# Patient Record
Sex: MALE | Race: WHITE | NOT HISPANIC OR LATINO | Employment: OTHER | URBAN - METROPOLITAN AREA
[De-identification: names, ages, dates, MRNs, and addresses within clinical notes are randomized per-mention and may not be internally consistent; named-entity substitution may affect disease eponyms.]

---

## 2022-09-20 ENCOUNTER — OFFICE VISIT (OUTPATIENT)
Dept: VASCULAR SURGERY | Facility: CLINIC | Age: 72
End: 2022-09-20
Payer: COMMERCIAL

## 2022-09-20 DIAGNOSIS — I87.303 STASIS EDEMA, BILATERAL: ICD-10-CM

## 2022-09-20 DIAGNOSIS — R60.0 BILATERAL LEG EDEMA: Primary | ICD-10-CM

## 2022-09-20 DIAGNOSIS — I83.893 VARICOSE VEINS OF LEG WITH SWELLING, BILATERAL: ICD-10-CM

## 2022-09-20 PROCEDURE — 99204 OFFICE O/P NEW MOD 45 MIN: CPT | Performed by: SPECIALIST

## 2022-09-20 RX ORDER — ISOSORBIDE MONONITRATE 60 MG/1
TABLET, EXTENDED RELEASE ORAL
COMMUNITY

## 2022-09-20 RX ORDER — TAMSULOSIN HYDROCHLORIDE 0.4 MG/1
CAPSULE ORAL
COMMUNITY
Start: 2022-07-17

## 2022-09-20 RX ORDER — LISINOPRIL 40 MG/1
TABLET ORAL
COMMUNITY
Start: 2022-07-03

## 2022-09-20 RX ORDER — CEFUROXIME AXETIL 500 MG/1
TABLET ORAL
COMMUNITY

## 2022-09-20 RX ORDER — FUROSEMIDE 20 MG
TABLET ORAL
COMMUNITY

## 2022-09-20 RX ORDER — METOPROLOL TARTRATE 25 MG/1
TABLET, FILM COATED ORAL
COMMUNITY

## 2022-09-20 RX ORDER — MULTIPLE VITAMINS W/ MINERALS TAB 9MG-400MCG
1 TAB ORAL DAILY
COMMUNITY

## 2022-09-20 RX ORDER — AMLODIPINE BESYLATE 10 MG/1
TABLET ORAL
COMMUNITY

## 2022-09-20 RX ORDER — HYDRALAZINE HYDROCHLORIDE 25 MG/1
TABLET, FILM COATED ORAL
COMMUNITY

## 2022-09-20 RX ORDER — BUSPIRONE HYDROCHLORIDE 7.5 MG/1
TABLET ORAL
COMMUNITY

## 2022-09-20 RX ORDER — SIMVASTATIN 20 MG
TABLET ORAL
COMMUNITY
Start: 2022-08-02

## 2022-09-20 RX ORDER — CITALOPRAM HYDROBROMIDE 20 MG/1
TABLET ORAL
COMMUNITY

## 2022-09-20 NOTE — PROGRESS NOTES
Consult requested by Dr. Chahal     Reason for consultation: Bilateral lower extremity edema.    HPI:  Patient is a 72-year-old white male with a longstanding history of bilateral lower extremity edema.  It is progressively worsened over time especially after his recent knee replacement and pelvic radiation for prostate cancer.  His right leg is worse than his left.  He denies any pain, varicose veins, nonhealing wounds or superficial phlebitis..  He has worn compression but now has difficulty getting it on.  He also has a history of a provoked left lower extremity DVT in 2018 after a long drive.  He has not had an ultrasound for reflux.  He now presents to me for evaluation of his bilateral lower extremity edema.    Pmhx: Hypertension, hyperlipidemia, history of prostate cancer status post radiation therapy, history of DVT in 2018    PsHx: Bilateral knee replacements,  tonsils, appendectomy      Current Outpatient Medications   Medication     apixaban ANTICOAGULANT (ELIQUIS) 2.5 MG tablet     amLODIPine (NORVASC) 10 MG tablet     busPIRone (BUSPAR) 7.5 MG tablet     cefuroxime (CEFTIN) 500 MG tablet     citalopram (CELEXA) 20 MG tablet     furosemide (LASIX) 20 MG tablet     hydrALAZINE (APRESOLINE) 25 MG tablet     isosorbide mononitrate (IMDUR) 60 MG 24 hr tablet     lisinopril (ZESTRIL) 40 MG tablet     metoprolol tartrate (LOPRESSOR) 25 MG tablet     multivitamin w/minerals (MULTIVITAMIN, THERAPEUTIC WITH MINERALS) tablet     simvastatin (ZOCOR) 20 MG tablet     tamsulosin (FLOMAX) 0.4 MG capsule     No current facility-administered medications for this visit.        Allergies   Allergen Reactions     Tetracycline Hives     Social History     Social History Narrative     Not on file     No family history on file.     ROS: 10 point ROS neg other than the symptoms noted above in the HPI.    PE:  B/P: Data Unavailable, T: Data Unavailable, P: Data Unavailable, R: Data Unavailable  General: well developed,  well nourished obese WM who appears their stated age  HEENT: NC/AT, EOMI, (-)icterus, (-)injection  Neck: Supple, No JVD  Chest: CTA  Heart: S1, S2, (-)m/r/g  Abd: Soft, non tender, non distended, non tender, no masses  Ext; Warm, right lower extremity with +2 edema and gaiter stasis changes.  No obvious varicose veins.  Left lower extremity:+1 edema with gaiter stasis changes and no obvious varicosities  Psych: AAOx3  Neuro: No focal deficits    Impression/plan:  This is a 72-year-old white male with bilateral lower extremity of unclear etiology with the right being worse than the left..  He has no obvious varicose vein so I suspect this may represent longstanding lymphedema.  He does have bilateral stasis changes making a CEAP 4 bilaterally.  I discussed the various etiologies of lower extremity edema and he expressed understanding.  After discussion the patient the plan at this time is to send him for an ultrasound for reflux and proceed based on those findings.    Genaro Riojas MD, FACS

## 2022-09-20 NOTE — LETTER
9/20/2022         RE: Jack Kingsley  [id number] [address] [facility] [address]        Dear Colleague,    Thank you for referring your patient, Jack Kingsley, to the [facility] [place]. Please see a copy of my visit note below.    Consult requested by Dr. Chahal     Reason for consultation: Bilateral lower extremity edema.    HPI:  Patient is a 72-year-old white male with a longstanding history of bilateral lower extremity edema.  It is progressively worsened over time especially after his recent knee replacement and pelvic radiation for prostate cancer.  His right leg is worse than his left.  He denies any pain, varicose veins, nonhealing wounds or superficial phlebitis..  He has worn compression but now has difficulty getting it on.  He also has a history of a provoked left lower extremity DVT in 2018 after a long drive.  He has not had an ultrasound for reflux.  He now presents to me for evaluation of his bilateral lower extremity edema.    Pmhx: Hypertension, hyperlipidemia, history of prostate cancer status post radiation therapy, history of DVT in 2018    PsHx: Bilateral knee replacements,  tonsils, appendectomy      Current Outpatient Medications   Medication     apixaban ANTICOAGULANT (ELIQUIS) 2.5 MG tablet     amLODIPine (NORVASC) 10 MG tablet     busPIRone (BUSPAR) 7.5 MG tablet     cefuroxime (CEFTIN) 500 MG tablet     citalopram (CELEXA) 20 MG tablet     furosemide (LASIX) 20 MG tablet     hydrALAZINE (APRESOLINE) 25 MG tablet     isosorbide mononitrate (IMDUR) 60 MG 24 hr tablet     lisinopril (ZESTRIL) 40 MG tablet     metoprolol tartrate (LOPRESSOR) 25 MG tablet     multivitamin w/minerals (MULTIVITAMIN, THERAPEUTIC WITH MINERALS) tablet     simvastatin (ZOCOR) 20 MG tablet     tamsulosin (FLOMAX) 0.4 MG capsule     No current facility-administered medications for this visit.        Allergies   Allergen Reactions     Tetracycline Hives     Social History     Social  History Narrative     Not on file     No family history on file.     ROS: 10 point ROS neg other than the symptoms noted above in the HPI.    PE:  B/P: Data Unavailable, T: Data Unavailable, P: Data Unavailable, R: Data Unavailable  General: well developed, well nourished obese WM who appears their stated age  HEENT: NC/AT, EOMI, (-)icterus, (-)injection  Neck: Supple, No JVD  Chest: CTA  Heart: S1, S2, (-)m/r/g  Abd: Soft, non tender, non distended, non tender, no masses  Ext; Warm, right lower extremity with +2 edema and gaiter stasis changes.  No obvious varicose veins.  Left lower extremity:+1 edema with gaiter stasis changes and no obvious varicosities  Psych: AAOx3  Neuro: No focal deficits    Impression/plan:  This is a 72-year-old white male with bilateral lower extremity of unclear etiology with the right being worse than the left..  He has no obvious varicose vein so I suspect this may represent longstanding lymphedema.  He does have bilateral stasis changes making a CEAP 4 bilaterally.  I discussed the various etiologies of lower extremity edema and he expressed understanding.  After discussion the patient the plan at this time is to send him for an ultrasound for reflux and proceed based on those findings.    Genaro Riojas MD, FACS      Again, thank you for allowing me to participate in the care of your patient.        Sincerely,        Genaro Riojas MD

## 2022-09-20 NOTE — NURSING NOTE
Patient Reported symptoms:    Right leg   Heaviness None of the time   Achiness None of the time   Swelling All of the time  Throbbing None of the time   Itching None of the time   Appearance Extremely noticeable   Impact on work/activities Symptoms but full able to participate    Left Leg   Heaviness None of the time   Achiness None of the time   Swelling All of the time  Throbbing None of the time   Itching None of the time   Appearance Extremely noticeable   Impact on work/activities Symptoms but full able to participate

## 2022-09-20 NOTE — PATIENT INSTRUCTIONS
Varicose Veins and Spider Veins    Varicose veins are swollen, enlarged veins most often found in the legs. They are usually blue or purple in color and may bulge, twist, and stand out under the skin. Spider veins are small veins just under your skin that can look red, blue or purple.        Normally, veins return blood from the body to the heart. The leg veins have one-way valves that prevent blood from flowing backward in the vein. When the valves are weak or damaged, blood backs up in the veins. This may cause some of the veins to swell and bulge and become varicose veins.     Symptoms  Varicose veins may or may not cause symptoms. If symptoms do occur, they can include:  Legs that feel tired, achy, heavy, or itchy  Leg swelling  Leg muscle cramps  Skin changes, such as discoloration, dryness, redness, or rash (in more severe cases, you may also have sores on the skin called venous leg ulcers)    Risk factors  There are a number of factors that increase the risk for varicose veins. These can include:   Being a woman  Being older  Sitting or standing for long periods  Being overweight  Being pregnant  Having a family history of varicose veins  Hormones, birth control pills    Treatment starts with simple self-help measures (see below). If these don't help, there are many procedures that can be done to shrink or remove varicose veins. Your healthcare provider can tell you more about these options, if needed.     Home care  Support or compression stockings will likely be prescribed. If so, be sure to wear them as directed. They may help improve blood flow.  Exercising helps strengthen your leg muscles and improve blood flow. To get the most benefit, choose exercises such as walking, swimming, or cycling. Also try to exercise for at least 30 minutes on most days.  Raising your legs above heart level will help relieve swelling and keep blood from pooling in veins. Try to elevate your legs for 15 to 20 minutes at  the end of the day, and whenever you're relaxing. To make sure your legs are raised above heart level, prop them up on cushions or large pillows.  To keep blood moving when you have to sit or stand for long periods, try these tips:  At work, take walking breaks instead of coffee breaks. Walk during your lunch hour. Or try flexing your feet up and down 10 times each hour.  When standing, raise yourself up and down on your toes, or rock back and forth on your heels.  If you are overweight, talk with your healthcare provider about setting up a weight-loss plan. Maintaining a healthy weight can help reduce the strain on your veins. It may also improve symptoms, such as swelling and aching.  If you have dryness and itching, ask your provider about special lotions that can be applied to the skin to help improve symptoms.     Follow-up care  Follow up with your healthcare provider, or as directed. If imaging tests were done, you'll be told the results and if there are any new findings that affect your care.     When to seek medical advice  Call your healthcare provider right away if any of these occur:  Sudden, severe leg swelling, pain, or redness  Symptoms worsen, or they don't improve with self-care  Bleeding from any affected veins  Ulcers form on the legs, ankles, or feet  Fever of 100.4 F (38 C) or higher, or as advised by your provider    [employer] last reviewed this educational content on 4/1/2018 2000-2021 [employer]. All rights reserved. This information is not intended as a substitute for professional medical care. Always follow your healthcare professional's instructions.    Self-Care for Spider and Varicose Veins    Your healthcare provider may suggest that you try self-care. Exercising and maintaining a healthy weight may keep problem veins from getting worse. Wearing elastic stockings and elevating your legs can help improve blood flow. Taking breaks when you sit or stand helps,  too.        Wearing compression stockings  Compression stockings gently squeeze veins so blood flows upward. If you need compression stockings, your healthcare provider can prescribe them for you. Follow your healthcare provider's advice about how and when to wear them. Compression stockings come in several different levels of pressure. Ask your healthcare provider which level of pressure would help you the most.     Raising your legs above heart level will help relieve swelling and keep blood from pooling in veins. Try to elevate your legs for 15 to 20 minutes at the end of the day, and whenever you're relaxing. To make sure your legs are raised above heart level, prop them up on cushions or large pillows.    To keep blood moving when you have to sit or stand for long periods, try these tips:  At work, take walking breaks instead of coffee breaks. Walk during your lunch hour. Or try flexing your feet up and down 10 times each hour.  When standing, raise yourself up and down on your toes, or rock back and forth on your heels.    [place] last reviewed this educational content on 11/1/2019 2000-2021 [employer]. All rights reserved. This information is not intended as a substitute for professional medical care. Always follow your healthcare professional's instructions.    Treatment Options    Sclerotherapy  Your healthcare provider will inject the vein with a special chemical that will quickly close the vein from the inside. This is particularly useful for spider veins and smaller varicose veins.      If you have large varicose veins, surgery may be the best choice. But it will not prevent new varicose veins from forming. Surgery is most often done in a surgery center or in the office.    If surgery is recommended for you, your surgery will be tailored to your needs. Varicose veins may be tied off (ligation), destroyed, or removed. Blood will then flow through the healthy veins. One or more of the  following techniques may be used:    Ablation (laser or radiofrequency)  A tiny cut in the skin is made near the varicose vein. A small tube called a catheter is inserted into the vein. Energy or heat released from the catheter tip will make the vein walls collapse and stick together, stopping all blood flow through the vein.         Ablation (glue)  A tiny cut in the skin is made near the varicose vein. A small tube called a catheter is inserted into the vein. Droplets of glue are deposited into the vein to make vein walls collapse and stick together stopping blood flow through the vein.    Microphlebectomy or ambulatory phlebectomy  A special hook is used to gently take out a varicose vein through tiny incisions. Microphlebectomy may be done in your healthcare provider's office.      Vein stripping and ligation (rare)  In more severe cases, the surgeon may tie off and remove veins by making smaller cuts in the skin. Smaller branching veins may also be tied off or removed.    Know about the risks  Your healthcare provider will talk with you about the risks of surgery. These include:  Bleeding or swelling  A sense of numbness, burning, or tingling in areas near the procedure  Edema or swelling in the legs  Clots in the deep veins that may travel to the lungs  Infection  Scarring  Inflammation related to the glue    [employer] last reviewed this educational content on 11/1/2019 (Sclerotherapy image) 12/1/2019 (Radiofrequency ablation image, Microphlebectomy image)    6547-7170 [employer]. All rights reserved. This information is not intended as a substitute for professional medical care. Always follow your healthcare professional's instructions.

## 2022-09-21 ENCOUNTER — ANCILLARY PROCEDURE (OUTPATIENT)
Dept: ULTRASOUND IMAGING | Facility: CLINIC | Age: 72
End: 2022-09-21
Attending: SPECIALIST
Payer: COMMERCIAL

## 2022-09-21 ENCOUNTER — VIRTUAL VISIT (OUTPATIENT)
Dept: VASCULAR SURGERY | Facility: CLINIC | Age: 72
End: 2022-09-21
Payer: COMMERCIAL

## 2022-09-21 DIAGNOSIS — I87.303 STASIS EDEMA, BILATERAL: ICD-10-CM

## 2022-09-21 DIAGNOSIS — I89.0 LYMPHEDEMA OF BOTH LOWER EXTREMITIES: Primary | ICD-10-CM

## 2022-09-21 DIAGNOSIS — I83.893 VARICOSE VEINS OF LEG WITH SWELLING, BILATERAL: ICD-10-CM

## 2022-09-21 PROCEDURE — 93970 EXTREMITY STUDY: CPT | Performed by: SPECIALIST

## 2022-09-21 PROCEDURE — 99213 OFFICE O/P EST LOW 20 MIN: CPT | Mod: 95 | Performed by: SPECIALIST

## 2022-09-21 NOTE — PROGRESS NOTES
Jack is a 72 year old who is being evaluated via a billable video visit.      How would you like to obtain your AVS? MyChart  If the video visit is dropped, the invitation should be resent by: Text to cell phone: [phone]  Will anyone else be joining your video visit? No          Video-Visit Details    Video Start Time: 3:44 PM    Type of service:  Video Visit    Consult requested by Dr. Chahal     Reason for consultation: Bilateral lower extremity edema.    Subjective:  Patient is a 72-year-old white male with a longstanding history of bilateral lower extremity edema.  It is progressively worsened over time especially after his recent knee replacement and pelvic radiation for prostate cancer.  His right leg is worse than his left.  He denies any pain, varicose veins, nonhealing wounds or superficial phlebitis..  He has worn compression but now has difficulty getting it on.  He also has a history of a provoked left lower extremity DVT in 2018 after a long drive.      He has ultrasound for which she now follows up.      Objective:  Ext; Warm, right lower extremity with +2 edema and gaiter stasis changes.  No obvious varicose veins.  Left lower extremity:+1 edema with gaiter stasis changes and no obvious varicosities    Name:  Jack Kingsley                                                     Patient ID: [id number]  Date: 2022                                                     : 1950  Sex: male                                                                    Examined by: HAVEN Vazquez RVT  Age:  72 year old                                                         Reading MD: Genaro Riojas MD     INDICATION:  Bilateral vv with edema R>L ; hx of Lt DVT in 2018      EXAM TYPE  BILATERAL LOWER EXTREMITY VENOUS DUPLEX FOR VENOUS INSUFFICIENCY  TECHNICAL SUMMARY     A duplex ultrasound study using color flow was performed, to evaluate the bilateral lower extremity veins for valvular  incompetence with the patient in a steep reversed trendelenberg.      RIGHT:     The deep veins demonstrate phasic flow, compress and respond to augmentations.  There is no DVT.  The common femoral and proximal femoral veins are  incompetent and free of thrombus. The remaining deep veins are competent and free of thrombus.      The GSV demonstrates phasic flow, compresses and responds to augmentations from the saphenofemoral junction to the ankle with no evidence of thrombus. The great saphenous vein measures 8.3 mm at the saphenofemoral junction, 7.3 mm at the proximal thigh and 5.1 mm at the knee. The GSV is incompetent from Proximal Calf to Distal Calf, with the greatest reflux time of 4965 milliseconds.       The AASV is not visualized.      The Giacomini vein is competent( 1.1 mm) communicating with the small saphenous vein at the knee level.      The SSV demonstrates phasic flow, compresses and responds to augmentations from the popliteal space to the ankle.  No thrombus is seen. The saphenopopliteal junction is competent (2.4 mm). The SSV is incompetent at the distal calf with a reflux time of 1221 milliseconds.  The SSV gives rise to a varicose branch measuring 2.4 mm off the Mid Calf that courses Medial with a reflux time of 2244 milliseconds.       Perforators:  There is an incompetent  vein ( 2.4 mm) at medial mallelous that communicates with the GSV. There is a second incompetent  vein (2.9 mm) at the mid calf 23 cm above the medial malleolus that communicates with the GSV.         LEFT:     The deep veins demonstrate phasic flow, compress and respond to augmentations.  There is no  DVT.  The common femoral vein is incompetent and free of thrombus. The remaining deep veins are competent and free of thrombus.      The GSV demonstrates phasic flow, compresses and responds to augmentations from the saphenofemoral junction to the ankle with no evidence of thrombus. The great saphenous  vein measures 8.4 mm at the saphenofemoral junction, 6.7 mm at the proximal thigh and 5.0 mm at the knee. The GSV is incompetent at the SFJ and from the mid to distal calf, with the greatest reflux time of 1254 milliseconds.       The AASV is competent ( 3.7 mm) draining into the saphenofemoral junction.      The Giacomini vein is incompetent ( 2.2 mm) communicating with the small saphenous vein at the knee level. The Giacomini vein is incompetent at the distal thigh with a reflux time of 1254 milliseconds.      The SSV demonstrates phasic flow, compresses and responds to augmentations from the popliteal space to the ankle.  No thrombus is seen. The saphenopopliteal junction is competent ( 5.6 mm).  The SSV is incompetent from the Mid Calf to Distal Calf with a reflux time of 2409 milliseconds.   The SSV gives rise to multiple incompetent varicose veins, the largest measuring 2.2 mm off the Mid Calf that courses Medial with a reflux time of 1188 milliseconds.     Perforators: there is no evidence of incompetent  veins at any level.      FINAL SUMMARY:     1.  There is no evidence of DVT.         2.  There is deep system incompetence in the right and left common femoral veins     3.  The right GSV is incompetent in the proximal to distal calf.     4.  There are 2 incompetent perforators in the distal calf.  One communicates with the GSV and the other is 23 cm above the medial malleolus.     5.  The right SSV has short segment incompetence in the mid calf.     6.  The left GSV is incompetent at the SF J and mid to distal calf.     7.  The left SSV is incompetent mid to distal calf giving rise to multiple incompetent branches.     8.  The time of incompetence is greater than 500 milliseconds in the superficial and  veins and greater than 1000 milliseconds in the deep veins.         Kate Riojas MD, FACS      Assessment/plan:  This is a 72-year-old white male with bilateral lower extremity of  unclear etiology with the right being worse than the left..  He has no obvious varicose veinS.  He does have bilateral stasis changes making a CEAP 4 bilaterally.  His ultrasound revealed only minimal superficial system reflux.  He does have some proximal deep system reflux in both his lower extremities as well.  I do not feel treating his superficial system reflux would fix his edema.  I suspect most of his symptoms are due to lymphedema.  I discussed these findings with the patient expressed understanding.  After discussion patient the plan at this time is refer him to lymphedema therapy.  He will follow-up with us as necessary.      Genaro Riojas MD, FACS          Video End Time:4:04 PM    Originating Location (pt. Location): Home    Distant Location (provider location):  [facility] [place]     Platform used for Video Visit: Cinemagram

## 2022-09-21 NOTE — LETTER
2022         RE: Jack Kingsley  [address] [address] [facility] [address]        Dear Colleague,    Thank you for referring your patient, Jack Kingsley, to the [facility] [place]. Please see a copy of my visit note below.    Jack is a 72 year old who is being evaluated via a billable video visit.      How would you like to obtain your AVS? MyChart  If the video visit is dropped, the invitation should be resent by: Text to cell phone: [phone]  Will anyone else be joining your video visit? No          Video-Visit Details    Video Start Time: 3:44 PM    Type of service:  Video Visit    Consult requested by Dr. Chahal     Reason for consultation: Bilateral lower extremity edema.    Subjective:  Patient is a 72-year-old white male with a longstanding history of bilateral lower extremity edema.  It is progressively worsened over time especially after his recent knee replacement and pelvic radiation for prostate cancer.  His right leg is worse than his left.  He denies any pain, varicose veins, nonhealing wounds or superficial phlebitis..  He has worn compression but now has difficulty getting it on.  He also has a history of a provoked left lower extremity DVT in 2018 after a long drive.      He has ultrasound for which she now follows up.      Objective:  Ext; Warm, right lower extremity with +2 edema and gaiter stasis changes.  No obvious varicose veins.  Left lower extremity:+1 edema with gaiter stasis changes and no obvious varicosities    Name:  Jack Kingsley                                                     Patient ID: [id number]  Date: 2022                                                     : 1950  Sex: male                                                                    Examined by: HAVEN Vazquez RVT  Age:  72 year old                                                         Reading MD: Genaro Riojas MD     INDICATION:  Bilateral vv with  edema R>L ; hx of Lt DVT in 2018      EXAM TYPE  BILATERAL LOWER EXTREMITY VENOUS DUPLEX FOR VENOUS INSUFFICIENCY  TECHNICAL SUMMARY     A duplex ultrasound study using color flow was performed, to evaluate the bilateral lower extremity veins for valvular incompetence with the patient in a steep reversed trendelenberg.      RIGHT:     The deep veins demonstrate phasic flow, compress and respond to augmentations.  There is no DVT.  The common femoral and proximal femoral veins are  incompetent and free of thrombus. The remaining deep veins are competent and free of thrombus.      The GSV demonstrates phasic flow, compresses and responds to augmentations from the saphenofemoral junction to the ankle with no evidence of thrombus. The great saphenous vein measures 8.3 mm at the saphenofemoral junction, 7.3 mm at the proximal thigh and 5.1 mm at the knee. The GSV is incompetent from Proximal Calf to Distal Calf, with the greatest reflux time of 4965 milliseconds.       The AASV is not visualized.      The Giacomini vein is competent( 1.1 mm) communicating with the small saphenous vein at the knee level.      The SSV demonstrates phasic flow, compresses and responds to augmentations from the popliteal space to the ankle.  No thrombus is seen. The saphenopopliteal junction is competent (2.4 mm). The SSV is incompetent at the distal calf with a reflux time of 1221 milliseconds.  The SSV gives rise to a varicose branch measuring 2.4 mm off the Mid Calf that courses Medial with a reflux time of 2244 milliseconds.       Perforators:  There is an incompetent  vein ( 2.4 mm) at medial mallelous that communicates with the GSV. There is a second incompetent  vein (2.9 mm) at the mid calf 23 cm above the medial malleolus that communicates with the GSV.         LEFT:     The deep veins demonstrate phasic flow, compress and respond to augmentations.  There is no  DVT.  The common femoral vein is incompetent and  free of thrombus. The remaining deep veins are competent and free of thrombus.      The GSV demonstrates phasic flow, compresses and responds to augmentations from the saphenofemoral junction to the ankle with no evidence of thrombus. The great saphenous vein measures 8.4 mm at the saphenofemoral junction, 6.7 mm at the proximal thigh and 5.0 mm at the knee. The GSV is incompetent at the SFJ and from the mid to distal calf, with the greatest reflux time of 1254 milliseconds.       The AASV is competent ( 3.7 mm) draining into the saphenofemoral junction.      The Giacomini vein is incompetent ( 2.2 mm) communicating with the small saphenous vein at the knee level. The Giacomini vein is incompetent at the distal thigh with a reflux time of 1254 milliseconds.      The SSV demonstrates phasic flow, compresses and responds to augmentations from the popliteal space to the ankle.  No thrombus is seen. The saphenopopliteal junction is competent ( 5.6 mm).  The SSV is incompetent from the Mid Calf to Distal Calf with a reflux time of 2409 milliseconds.   The SSV gives rise to multiple incompetent varicose veins, the largest measuring 2.2 mm off the Mid Calf that courses Medial with a reflux time of 1188 milliseconds.     Perforators: there is no evidence of incompetent  veins at any level.      FINAL SUMMARY:     1.  There is no evidence of DVT.         2.  There is deep system incompetence in the right and left common femoral veins     3.  The right GSV is incompetent in the proximal to distal calf.     4.  There are 2 incompetent perforators in the distal calf.  One communicates with the GSV and the other is 23 cm above the medial malleolus.     5.  The right SSV has short segment incompetence in the mid calf.     6.  The left GSV is incompetent at the SF J and mid to distal calf.     7.  The left SSV is incompetent mid to distal calf giving rise to multiple incompetent branches.     8.  The time of incompetence  is greater than 500 milliseconds in the superficial and  veins and greater than 1000 milliseconds in the deep veins.         Kate Riojas MD, FACS      Assessment/plan:  This is a 72-year-old white male with bilateral lower extremity of unclear etiology with the right being worse than the left..  He has no obvious varicose veinS.  He does have bilateral stasis changes making a CEAP 4 bilaterally.  His ultrasound revealed only minimal superficial system reflux.  He does have some proximal deep system reflux in both his lower extremities as well.  I do not feel treating his superficial system reflux would fix his edema.  I suspect most of his symptoms are due to lymphedema.  I discussed these findings with the patient expressed understanding.  After discussion patient the plan at this time is refer him to lymphedema therapy.  He will follow-up with us as necessary.      Genaro Riojas MD, FACS          Video End Time:4:04 PM    Originating Location (pt. Location): Home    Distant Location (provider location):  [facility] [place]     Platform used for Video Visit: Sylvia        Again, thank you for allowing me to participate in the care of your patient.        Sincerely,        Genaro Riojas MD

## 2022-10-17 ENCOUNTER — HOSPITAL ENCOUNTER (OUTPATIENT)
Dept: OCCUPATIONAL THERAPY | Facility: CLINIC | Age: 72
Setting detail: THERAPIES SERIES
Discharge: HOME | End: 2022-10-17
Attending: SPECIALIST
Payer: COMMERCIAL

## 2022-10-17 DIAGNOSIS — I89.0 LYMPHEDEMA OF BOTH LOWER EXTREMITIES: ICD-10-CM

## 2022-10-17 DIAGNOSIS — I87.303 STASIS EDEMA, BILATERAL: ICD-10-CM

## 2022-10-17 PROCEDURE — 97165 OT EVAL LOW COMPLEX 30 MIN: CPT | Mod: GO

## 2022-10-17 PROCEDURE — 97140 MANUAL THERAPY 1/> REGIONS: CPT | Mod: GO

## 2022-10-17 NOTE — PROGRESS NOTES
Rehabilitation Services        OUTPATIENT OCCUPATIONAL THERAPY EDEMA EVALUATION  PLAN OF TREATMENT FOR OUTPATIENT REHABILITATION  (COMPLETE FOR INITIAL CLAIMS ONLY)  Patient's Last Name, First Name, ASHLEY WadsworthbestJack forman                           Provider s Name:   Indy SCOTTIE Tan Medical Record No.  [id number]     Start of Care Date:  10/17/22   Onset Date:  09/01/19   Type:  OT   Medical Diagnosis:  LE Lymphedema   Therapy Diagnosis:  BLE lymphedema Visits from SOC:  1                                     __________________________________________________________________________________   Plan of Treatment/Functional Goals:    Manual lymph drainage, Gradient compression bandaging, Fit for compression garment, Exercises, Precautions to prevent infection / exacerbation, Education, Manual therapy, ADL training, Skin care / precautions, Soft tissue mobilization, Home management program development        GOALS  1. Goal description: Pt will demonstrate a reduction of at least 200 mL in BLE to improve skin integrity, safety with mobility and fit of clothing       Target date: 01/15/23  2. Goal description: Pt will be fit for and demonstrate independence using new compression garments for long term edema management as evidenced by a no more than 50 mL increase in volume after transitioning into garments.       Target date: 01/15/23  3. Goal description: Pt will verbalize understanding of long term edema management including following recommended wear schedule for compression garments, manual techniques, skin care and exercise.       Target date: 01/15/23    Treatment Frequency: 1x/week (will increase to twice a week if pt/spouse are unable to reapply GCB)   Treatment duration: 6 weeks    Indy Tan OT                                    I CERTIFY THE NEED FOR THESE SERVICES FURNISHED UNDER         THIS PLAN OF TREATMENT AND WHILE UNDER MY CARE     (Physician co-signature of this document indicates review and certification of the therapy plan).                   Certification date from: 10/17/22       Certification date to: 01/15/23           Referring physician: Dr Genaro Riojas   Initial Assessment  See Epic Evaluation- Start of care: 10/17/22

## 2022-10-17 NOTE — PROGRESS NOTES
10/17/22 0900   Quick Adds   Quick Adds Certification   Rehab Discipline   Discipline OT   Type of Visit   Type of visit Initial Edema Evaluation   General Information   Start of care 10/17/22   Referring physician Dr Genaro Riojas   Orders Evaluate and treat as indicated   Order date 09/21/22   Medical diagnosis Lymphedema of both LEs, Statis edema   Onset of illness / date of surgery 09/21/22   Edema onset 09/01/19   Affected body parts LLE;RLE   Edema etiology Unknown;Radiation;Chronic Venous Insufficiency   Location - Radiation prostate cancer   Pertinent history of current problem (PT: include personal factors and/or comorbidities that impact the POC; OT: include additional occupational profile info) Pt is a 73 yo male referred to lymphedema therapy to address chronic LE edema. Pt reports he first noticed swelling in Sept 2019 after a bout of LE cellulitis. Since then he has noticed increasing edema in both legs, with the right being worse than the left. He has compression stockings and edema is better when he uses them, but he does not use them consistantly.   Surgical / medical history reviewed Yes   Prior level of functional mobility indep   Prior treatment Compression garments   Community support Family / friend caregiver   Patient role / employment history Retired  (ME)   Living environment House / townhome   Living environment comments no mobility concerns, moving into a one level townhome at the end of the month   Fall Risk Screen   Fall screen completed by OT   Have you fallen 2 or more times in the past year? No   Have you fallen and had an injury in the past year? No   Is patient a fall risk? No   Abuse Screen (yes response referral indicated)   Feels Unsafe at Home or Work/School no   Feels Threatened by Someone no   Does Anyone Try to Keep You From Having Contact with Others or Doing Things Outside Your Home? no   Physical Signs of Abuse Present no   Cognitive Status   Orientation Orientation  "to person, place and time   Level of consciousness Alert   Edema Exam / Assessment   Skin condition comments BLE with skin intact, RLE with several healed scabs on anterior shin. Both legs with areas of statis dermatitis/scaling on lateral mid shins. Hemosideran staining in gaiter area. 2-3 plus pitting edema from toes to knee crease with R>L. Skin from mid shin down is taut/smooth with decreased hair growth.   Girth Measurements   Girth Measurements Refer to separate girth measurement flowsheet   Volume LE   Right LE (mL) 4578   Left LE (mL) 4367   Range of Motion   ROM comments decreased ankle ROM with swelling   Strength   Strength No deficits were identified   Activities of Daily Living   Activities of Daily Living indep, has trouble with donning compression stockings but is able to   Bed Mobility   Bed mobility indep   Transfers   Transfers indep   Gait / Locomotion   Gait / Locomotion indep   Sensory   Sensory perception comments reports some mild numbness in LEs, unsure if from swelling or chronic/neuropathy   Vascular Assessment   Vascular Assessment Comments notes from vein competency study 9/21/22 \"His ultrasound revealed only minimal superficial system reflux. 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   Assessment of Occupational Performance 1-3 Performance Deficits   Identified Performance Deficits impaired skin integrity, impaired fit of clothes/shoes, at risk for " progression if not addressed   Clinical Decision Making (Complexity) Low complexity   Treatment Frequency 1x/week  (will increase to twice a week if pt/spouse are unable to reapply GCB)   Treatment duration 6 weeks   Patient / family and/or staff in agreement with plan of care Yes   Risks and benefits of therapy have been explained Yes   Clinical impression comments Pt presents with chronic LE lymphedema/phlebolymphedema and has a history of cellulitis, weeping and wounds. Pt would benefit from skilled treatment to reduce BLE edema, fit for compression garments and provide long term management education.   Goals   Edema Eval Goals 1;2;3   Goal 1   Goal identifier Volume   Goal description Pt will demonstrate a reduction of at least 200 mL in BLE to improve skin integrity, safety with mobility and fit of clothing   Target date 01/15/23   Goal 2   Goal identifier Garments   Goal description Pt will be fit for and demonstrate independence using new compression garments for long term edema management as evidenced by a no more than 50 mL increase in volume after transitioning into garments.   Target date 01/15/23   Goal 3   Goal identifier Home program   Goal description Pt will verbalize understanding of long term edema management including following recommended wear schedule for compression garments, manual techniques, skin care and exercise.   Target date 01/15/23   Total Evaluation Time   OT Eval, Low Complexity Minutes (38055) 15   Certification   Certification date from 10/17/22   Certification date to 01/15/23   Medical Diagnosis LE Lymphedema   Certification I certify the need for these services furnished under this plan of treatment and while under my care.  (Physician co-signature of this document indicates review and certification of the therapy plan).

## 2022-10-25 ENCOUNTER — HOSPITAL ENCOUNTER (OUTPATIENT)
Dept: OCCUPATIONAL THERAPY | Facility: CLINIC | Age: 72
Setting detail: THERAPIES SERIES
Discharge: HOME | End: 2022-10-25
Attending: SPECIALIST
Payer: COMMERCIAL

## 2022-10-25 PROCEDURE — 97140 MANUAL THERAPY 1/> REGIONS: CPT | Mod: GO

## 2022-11-01 ENCOUNTER — HOSPITAL ENCOUNTER (OUTPATIENT)
Dept: OCCUPATIONAL THERAPY | Facility: CLINIC | Age: 72
Setting detail: THERAPIES SERIES
Discharge: HOME | End: 2022-11-01
Attending: SPECIALIST
Payer: COMMERCIAL

## 2022-11-01 PROCEDURE — 97140 MANUAL THERAPY 1/> REGIONS: CPT | Mod: GO

## 2022-11-14 ENCOUNTER — HOSPITAL ENCOUNTER (OUTPATIENT)
Dept: OCCUPATIONAL THERAPY | Facility: CLINIC | Age: 72
Setting detail: THERAPIES SERIES
Discharge: HOME | End: 2022-11-14
Attending: SPECIALIST
Payer: COMMERCIAL

## 2022-11-14 PROCEDURE — 97140 MANUAL THERAPY 1/> REGIONS: CPT | Mod: GO

## 2022-11-14 NOTE — PROGRESS NOTES
[facility] Rehabilitation Services    Outpatient Occupational Therapy Discharge Note  Patient: Jack Kingsley  : 1950    Beginning/End Dates of Reporting Period:  10/17/22 to 22    Referring Provider: Dr Genaro Riojas    Therapy Diagnosis: BLE lymphedema    Client Self Report: Pt reported that yesterday he was able to wear a pair of shoes he had not fit into for over a year    Objective Measurements:     Objective Measure: skin assessment   Details: BLE with skin intact, stasis dermatitis resolved with some mild dry skin today but no scaling. Hemosideran staining in gaiter area. 1 plus pitting edema in toes where compression ends, minimal edema from mid foot to knee crease.      Goals:   Goal Identifier Volume   Goal Description Pt will demonstrate a reduction of at least 200 mL in BLE to improve skin integrity, safety with mobility and fit of clothing   Target Date 01/15/23   Date Met  10/25/22   Progress (detail required for progress note): Overall RLE -644 mL, LLE -154 mL, goal met     Goal Identifier Garments   Goal Description Pt will be fit for and demonstrate independence using new compression garments for long term edema management as evidenced by a no more than 50 mL increase in volume after transitioning into garments.   Target Date 01/15/23   Date Met  22   Progress (detail required for progress note): pt successfully transitioned into new velcro wraps, with a continued decrease in total volume, goal met     Goal Identifier Home program   Goal Description Pt will verbalize understanding of long term edema management including following recommended wear schedule for compression garments, manual techniques, skin care and exercise.   Target Date 01/15/23   Date Met  22   Progress (detail required for progress note): Pt verbalized understanding of wear schedule, skin care, exercise and garment  care, goal met       Plan:  Discharge from therapy.    Reason for Discharge: Patient has met all goals.  Since evaluation, pts BLE edema has signficantly reduced using gradient compression, skin care, elevation and exercise. Pt has shown an improvement in skin integrity, with all open areas/wounds/scabs resolved and dermatitis resolved. Pt has been fit for and transitioned to using CompreFlex transition Velcro compression garments and has maintained edema reduction since transitioning. Pt has demonstrated independence with following the recommended wear schedule, donning/doffing compression garments, HEP and skin care recommendations. Pt has been provided all the education and tools needed to continue to effectively manage LE lymphedema. Pt has met all goals and is ready, and agreeable with, discharge today.    Equipment Issued: CompreFlex Transition calf pieces and CompreBoot foot pieces    Discharge Plan: Patient to continue home program.